# Patient Record
Sex: FEMALE | Race: WHITE | NOT HISPANIC OR LATINO | Employment: OTHER | URBAN - METROPOLITAN AREA
[De-identification: names, ages, dates, MRNs, and addresses within clinical notes are randomized per-mention and may not be internally consistent; named-entity substitution may affect disease eponyms.]

---

## 2018-04-03 ENCOUNTER — APPOINTMENT (OUTPATIENT)
Dept: RADIOLOGY | Facility: IMAGING CENTER | Age: 73
End: 2018-04-03
Attending: NURSE PRACTITIONER
Payer: MEDICARE

## 2018-04-03 ENCOUNTER — OFFICE VISIT (OUTPATIENT)
Dept: URGENT CARE | Facility: CLINIC | Age: 73
End: 2018-04-03
Payer: MEDICARE

## 2018-04-03 VITALS
HEART RATE: 77 BPM | BODY MASS INDEX: 22.88 KG/M2 | WEIGHT: 134 LBS | OXYGEN SATURATION: 98 % | SYSTOLIC BLOOD PRESSURE: 150 MMHG | DIASTOLIC BLOOD PRESSURE: 72 MMHG | HEIGHT: 64 IN | TEMPERATURE: 99.4 F | RESPIRATION RATE: 16 BRPM

## 2018-04-03 DIAGNOSIS — L03.031 CELLULITIS OF TOE OF RIGHT FOOT: ICD-10-CM

## 2018-04-03 DIAGNOSIS — M79.674 GREAT TOE PAIN, RIGHT: ICD-10-CM

## 2018-04-03 DIAGNOSIS — M25.471 RIGHT ANKLE SWELLING: ICD-10-CM

## 2018-04-03 DIAGNOSIS — M79.89 RIGHT LEG SWELLING: ICD-10-CM

## 2018-04-03 DIAGNOSIS — S99.921A INJURY OF TOE ON RIGHT FOOT, INITIAL ENCOUNTER: ICD-10-CM

## 2018-04-03 PROCEDURE — 73660 X-RAY EXAM OF TOE(S): CPT | Mod: TC,RT,59 | Performed by: NURSE PRACTITIONER

## 2018-04-03 PROCEDURE — 99204 OFFICE O/P NEW MOD 45 MIN: CPT | Performed by: NURSE PRACTITIONER

## 2018-04-03 PROCEDURE — 73630 X-RAY EXAM OF FOOT: CPT | Mod: TC,RT | Performed by: NURSE PRACTITIONER

## 2018-04-03 RX ORDER — SIMVASTATIN 20 MG
TABLET ORAL
COMMUNITY
Start: 2018-02-12

## 2018-04-03 RX ORDER — METOPROLOL SUCCINATE 50 MG/1
TABLET, EXTENDED RELEASE ORAL
COMMUNITY
Start: 2018-02-19

## 2018-04-03 RX ORDER — DOXYCYCLINE HYCLATE 100 MG
100 TABLET ORAL 2 TIMES DAILY
Qty: 14 TAB | Refills: 0 | Status: SHIPPED | OUTPATIENT
Start: 2018-04-03 | End: 2018-04-10

## 2018-04-03 RX ORDER — LOSARTAN POTASSIUM 25 MG/1
TABLET ORAL
COMMUNITY
Start: 2018-02-08

## 2018-04-03 RX ORDER — AMLODIPINE BESYLATE 2.5 MG/1
TABLET ORAL
COMMUNITY
Start: 2018-02-08

## 2018-04-03 ASSESSMENT — ENCOUNTER SYMPTOMS
SORE THROAT: 0
WEAKNESS: 0
CHILLS: 0
SHORTNESS OF BREATH: 0
EYE PAIN: 0
NAUSEA: 0
JOINT SWELLING: 1
VOMITING: 0
MYALGIAS: 0
FEVER: 0
DIZZINESS: 0
NUMBNESS: 0

## 2018-04-04 NOTE — PROGRESS NOTES
Subjective:     Kerri Carr is a 72 y.o. female who presents for Foot Swelling ((R) big toe x a couple of days)  Patient presents to clinic today with complaints of right great toe pain. Patient's  stepped on her toe in which he is a big gen weighs 250+ pounds. Patient unsure if her toe was crushed. Since that time she's had pain, swelling. Patient also unsure if she was bit by a bug on the toe she felt something in her sock. Patient states that she used a  foot that was pretty intoxicated and is unsure if it caused an infection. Patient states her toe and foot appears to be red with swelling. Patient denies any fevers, chills, shortness of breath. Patient denies any cramping of the calf.Patient is traveling with her  and has been riding in a bus for the past couple days. Patient states that she has had intermittent breaks of walking however due to the pain she is having in her right foot and right calf she has not been wanting to move around much.   Patient's right knee has been strained prior to the incident in which she has been favoring her right leg when walking. Patient contributes the right lower leg swelling to the fact that she's not been bearing weight on extremity.     Foot Swelling   This is a new problem. Episode onset: 3 days. The problem occurs constantly. The problem has been gradually worsening. Associated symptoms include joint swelling. Pertinent negatives include no chest pain, chills, congestion, fever, myalgias, nausea, numbness, rash, sore throat, vomiting or weakness. Associated symptoms comments: Right toe pain  . The symptoms are aggravated by walking. She has tried nothing for the symptoms. The treatment provided no relief.   Toe Injury   This is a new problem. The current episode started in the past 7 days. The problem occurs constantly. The problem has been unchanged. Associated symptoms include joint swelling. Pertinent negatives include no chest pain,  "chills, congestion, fever, myalgias, nausea, numbness, rash, sore throat, vomiting or weakness. Associated symptoms comments: Right toe pain, redness. Nothing aggravates the symptoms. She has tried nothing for the symptoms. The treatment provided no relief.     Past Medical History:   Diagnosis Date   • Hyperlipidemia    • Hypertension    History reviewed. No pertinent surgical history.  Social History     Social History   • Marital status:      Spouse name: N/A   • Number of children: N/A   • Years of education: N/A     Occupational History   • Not on file.     Social History Main Topics   • Smoking status: Never Smoker   • Smokeless tobacco: Never Used   • Alcohol use Not on file   • Drug use: Unknown   • Sexual activity: Not on file     Other Topics Concern   • Not on file     Social History Narrative   • No narrative on file      Family History   Problem Relation Age of Onset   • Family history unknown: Yes    Review of Systems   Constitutional: Negative for chills and fever.   HENT: Negative for congestion and sore throat.    Eyes: Negative for pain.   Respiratory: Negative for shortness of breath.    Cardiovascular: Negative for chest pain.   Gastrointestinal: Negative for nausea and vomiting.   Genitourinary: Negative for hematuria.   Musculoskeletal: Positive for joint pain and joint swelling. Negative for myalgias.        Right ankle swelling, right toe pain   Skin: Negative for rash.   Neurological: Negative for dizziness, weakness and numbness.     Allergies   Allergen Reactions   • Benadryl Allergy    • Other Drug      Spearmint, melvin   • Penicillins       Objective:   /72   Pulse 77   Temp 37.4 °C (99.4 °F)   Resp 16   Ht 1.626 m (5' 4\")   Wt 60.8 kg (134 lb)   SpO2 98%   BMI 23.00 kg/m²   Physical Exam   Constitutional: She is oriented to person, place, and time. She appears well-developed and well-nourished. No distress.   HENT:   Head: Normocephalic and atraumatic.   Right Ear: " Tympanic membrane normal.   Left Ear: Tympanic membrane normal.   Nose: Nose normal. Right sinus exhibits no maxillary sinus tenderness and no frontal sinus tenderness. Left sinus exhibits no maxillary sinus tenderness and no frontal sinus tenderness.   Mouth/Throat: Uvula is midline, oropharynx is clear and moist and mucous membranes are normal. No posterior oropharyngeal edema, posterior oropharyngeal erythema or tonsillar abscesses. No tonsillar exudate.   Eyes: Conjunctivae and EOM are normal. Pupils are equal, round, and reactive to light. Right eye exhibits no discharge. Left eye exhibits no discharge.   Cardiovascular: Normal rate and regular rhythm.    No murmur heard.  Pulmonary/Chest: Effort normal and breath sounds normal. No respiratory distress.   Abdominal: Soft. She exhibits no distension. There is no tenderness.   Musculoskeletal:        Right lower leg: She exhibits swelling. She exhibits no tenderness and no bony tenderness.        Legs:       Right foot: There is tenderness, bony tenderness and swelling.        Feet:    Right great toe tender to palpation, erythema present. Notable amount of swelling present of the right ankle/calf. Homans sign negative. Full range of motion with flexion and extension at the ankle.   Neurological: She is alert and oriented to person, place, and time. She has normal reflexes. No sensory deficit.   Skin: Skin is warm, dry and intact.   Psychiatric: She has a normal mood and affect.         Assessment/Plan:   Assessment    1. Great toe pain, right  CANCELED: DX-TOE(S) 2+ RIGHT   2. Right leg swelling  UC AMA/Refusal of Treatment   3. Right ankle swelling  DX-FOOT-COMPLETE 3+ RIGHT   4. Cellulitis of toe of right foot  doxycycline (VIBRAMYCIN) 100 MG Tab     Xray results  Degenerative changes, most prominent at the first MTP joint  Degenerative changes, most prominent at the first MTP joint.  Calcaneal spurring.    Cannot rule out DVT at this time without further  evaluation. Ultrasound unavailable at clinical site at this time. My recommendation that the patient be evaluated in the ER for rule out DVT. Patient declining to this evening will have patient sign AMA.      Will treat suspected cellulitis of the right foot. Patient wrapped with an Ace bandage for compression or swelling. Discussed with patient if symptoms not improved by tomorrow she should be reevaluated in seen in the ER. Strongly suggested patient follow up.      Patient given precautionary s/sx that mandate immediate follow up and evaluation in the ED. Advised of risks of not doing so.    DDX, Supportive care, and indications for immediate follow-up discussed with patient.    Instructed to return to clinic or nearest emergency department if we are not available for any change in condition, further concerns, or worsening of symptoms.    The patient demonstrated a good understanding and agreed with the treatment plan.